# Patient Record
Sex: FEMALE | Race: WHITE | URBAN - METROPOLITAN AREA
[De-identification: names, ages, dates, MRNs, and addresses within clinical notes are randomized per-mention and may not be internally consistent; named-entity substitution may affect disease eponyms.]

---

## 2020-09-10 ENCOUNTER — OFFICE VISIT (OUTPATIENT)
Dept: URBAN - METROPOLITAN AREA CLINIC 32 | Facility: CLINIC | Age: 70
End: 2020-09-10
Payer: MEDICARE

## 2020-09-10 PROCEDURE — 99203 OFFICE O/P NEW LOW 30 MIN: CPT | Performed by: OPHTHALMOLOGY

## 2020-09-10 PROCEDURE — 92285 EXTERNAL OCULAR PHOTOGRAPHY: CPT | Performed by: OPHTHALMOLOGY

## 2020-09-10 ASSESSMENT — INTRAOCULAR PRESSURE
OD: 12
OS: 11

## 2020-09-10 NOTE — IMPRESSION/PLAN
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 Left. Condition: new problem addtl w/u needed. Symptoms: may improve with surgery. Vision: vision threatening. Plan: Discussed diagnosis in detail with patient. Discussed treatment options with patient. Biopsy of left lower eyelid is + for Melanoma in situ. Will need referral for Mohs procedure, followed by reconstruction of eyelid by myself. Surgical treatment is recommended. Recommend full thickness skin graft and or flaps with left lateral canthoplasty. Surgical risks and benefits were discussed, explained and understood by patient. Patient elects to have surgery. RL2. Avoid NSAIDS 7 days prior to surgery.

## 2020-09-10 NOTE — ASSESSMENT/PLAN
Impression: Photo External - OD: Good-upper lid skin fold rests behind lashes; OS Good-6.0mmx5.0mm hyperpigmentation of lateral LL skin Plan: biopsy site is positive for malignancy, coordinate mohs and closure

## 2020-09-18 ENCOUNTER — NEW PATIENT (OUTPATIENT)
Dept: URBAN - METROPOLITAN AREA CLINIC 30 | Facility: CLINIC | Age: 70
End: 2020-09-18
Payer: MEDICARE

## 2020-09-18 DIAGNOSIS — H25.813 COMBINED FORMS OF AGE-RELATED CATARACT, BILATERAL: ICD-10-CM

## 2020-09-18 PROCEDURE — 92134 CPTRZ OPH DX IMG PST SGM RTA: CPT | Performed by: OPTOMETRIST

## 2020-09-18 PROCEDURE — 92004 COMPRE OPH EXAM NEW PT 1/>: CPT | Performed by: OPTOMETRIST

## 2020-09-18 ASSESSMENT — VISUAL ACUITY
OS: 20/30
OD: 20/30

## 2020-09-18 ASSESSMENT — INTRAOCULAR PRESSURE
OS: 9
OD: 11

## 2020-09-18 ASSESSMENT — KERATOMETRY
OS: 42.17
OD: 42.14

## 2020-09-25 ENCOUNTER — PROCEDURE (OUTPATIENT)
Dept: URBAN - METROPOLITAN AREA SURGERY 10 | Facility: SURGERY | Age: 70
End: 2020-09-25
Payer: MEDICARE

## 2020-09-25 RX ORDER — HYDROCODONE BITARTRATE AND ACETAMINOPHEN 5; 325 MG/1; MG/1
TABLET ORAL
Qty: 24 | Refills: 0 | Status: ACTIVE
Start: 2020-09-25

## 2020-09-30 ENCOUNTER — POST-OPERATIVE VISIT (OUTPATIENT)
Dept: URBAN - METROPOLITAN AREA CLINIC 32 | Facility: CLINIC | Age: 70
End: 2020-09-30

## 2020-09-30 DIAGNOSIS — Z48.89 ENCOUNTER FOR OTHER SPECIFIED SURGICAL AFTERCARE: Primary | ICD-10-CM

## 2020-09-30 PROCEDURE — 99024 POSTOP FOLLOW-UP VISIT: CPT | Performed by: OPTOMETRIST

## 2020-09-30 NOTE — IMPRESSION/PLAN
Impression: S/P MOHS Reconstruction total eyelid  - 1 Day. Encounter for other specified surgical aftercare  Z48.89. Post operative instructions reviewed - Plan: The surgical eye(s) is improving well. Continue to follow current drop plan and post operative instructions. Recommend artificial tears throughout post operative period. RTC for scheduled follow up.

## 2020-10-08 ENCOUNTER — POST-OPERATIVE VISIT (OUTPATIENT)
Dept: URBAN - METROPOLITAN AREA CLINIC 32 | Facility: CLINIC | Age: 70
End: 2020-10-08

## 2020-10-08 PROCEDURE — 99024 POSTOP FOLLOW-UP VISIT: CPT | Performed by: OPHTHALMOLOGY

## 2020-10-08 ASSESSMENT — INTRAOCULAR PRESSURE
OD: 9
OS: 7

## 2020-10-08 NOTE — IMPRESSION/PLAN
Impression: S/P MOHS Reconstruction total eyelid OS - 13 Days. Encounter for other specified surgical aftercare  Z48.89. Post operative instructions reviewed - Condition is improving -photo updated Plan: use aquaphor for increased moisturization to skin graft and flap.  --Continue aqupahor TID to skin graft and QHS to bilateral upper lid incisions

## 2020-11-05 ENCOUNTER — POST-OPERATIVE VISIT (OUTPATIENT)
Dept: URBAN - METROPOLITAN AREA CLINIC 32 | Facility: CLINIC | Age: 70
End: 2020-11-05

## 2020-11-05 PROCEDURE — 99024 POSTOP FOLLOW-UP VISIT: CPT | Performed by: OPHTHALMOLOGY

## 2020-11-05 ASSESSMENT — INTRAOCULAR PRESSURE
OS: 9
OD: 11

## 2020-11-05 NOTE — IMPRESSION/PLAN
Impression: S/P MOHS Reconstruction total eyelid OS - 37 Days. Encounter for other specified surgical aftercare  Z48.89. Photo updated today Plan: start vitamin e oil with massage to upper lid incisions once daily for 60 seconds
start vitamin e oil with massage to left lateral lower eyelid skin graft once daily for 60 seconds.

## 2020-12-17 ENCOUNTER — POST-OPERATIVE VISIT (OUTPATIENT)
Dept: URBAN - METROPOLITAN AREA CLINIC 32 | Facility: CLINIC | Age: 70
End: 2020-12-17
Payer: MEDICARE

## 2020-12-17 PROCEDURE — 99024 POSTOP FOLLOW-UP VISIT: CPT | Performed by: OPHTHALMOLOGY

## 2020-12-17 ASSESSMENT — INTRAOCULAR PRESSURE
OS: 9
OD: 9

## 2020-12-17 NOTE — IMPRESSION/PLAN
Impression:  Encounter for other specified surgical aftercare  Z48.89. Plan: skin graft is healing nicely, continue vitamin e oil and massage --Advised patient to use artificial tears for comfort.

## 2021-02-11 ENCOUNTER — OFFICE VISIT (OUTPATIENT)
Dept: URBAN - METROPOLITAN AREA CLINIC 32 | Facility: CLINIC | Age: 71
End: 2021-02-11

## 2021-02-11 DIAGNOSIS — D03.122 MELANOMA IN SITU OF LEFT LOWER EYELID, INCLUDING CANTHUS: Primary | Chronic | ICD-10-CM

## 2021-02-11 PROCEDURE — 99213 OFFICE O/P EST LOW 20 MIN: CPT | Performed by: OPHTHALMOLOGY

## 2021-02-11 ASSESSMENT — INTRAOCULAR PRESSURE
OD: 12
OS: 11

## 2021-02-11 NOTE — IMPRESSION/PLAN
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 Condition: resolved. Symptoms: will continue to monitor. Vision: vision threatening. Plan: Discussed diagnosis in detail with patient. Discussed treatment options with patient. No treatment is required at this time, no evidence of recurrence. Will continue to observe condition and or symptoms.

## 2021-07-29 ENCOUNTER — OFFICE VISIT (OUTPATIENT)
Dept: URBAN - METROPOLITAN AREA CLINIC 32 | Facility: CLINIC | Age: 71
End: 2021-07-29
Payer: MEDICARE

## 2021-07-29 DIAGNOSIS — H01.021 SQUAMOUS BLEPHARITIS RIGHT UPPER EYELID: ICD-10-CM

## 2021-07-29 DIAGNOSIS — H01.024 SQUAMOUS BLEPHARITIS LEFT UPPER EYELID: ICD-10-CM

## 2021-07-29 PROCEDURE — 99213 OFFICE O/P EST LOW 20 MIN: CPT | Performed by: OPHTHALMOLOGY

## 2021-07-29 RX ORDER — DOXYCYCLINE 100 MG/1
100 MG TABLET, FILM COATED ORAL
Qty: 60 | Refills: 1 | Status: INACTIVE
Start: 2021-07-29 | End: 2022-01-13

## 2021-07-29 ASSESSMENT — INTRAOCULAR PRESSURE
OD: 13
OS: 13

## 2021-07-29 NOTE — IMPRESSION/PLAN
Impression: Squamous blepharitis left upper eyelid: H01.024. Left. Condition: new problem addtl w/u needed.  Plan: see plan #2

## 2021-07-29 NOTE — IMPRESSION/PLAN
Impression: Squamous blepharitis right upper eyelid: H01.021. Condition: new problem addtl w/u needed. Symptoms: will treat with meds. Vision: vision not affected. Plan: Discussed diagnosis in detail with patient. Discussed treatment options with patient. Apply hot compress for 3-5 minutes followed by firm upward massage to lower lids, and firm downward massage to upper lids. ERx doxycycline 100mg tabs to use BID x 30 days then Qday.

## 2021-07-29 NOTE — IMPRESSION/PLAN
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 Condition: established, stable. Symptoms: will continue to monitor. Vision: vision threatening. Plan: Discussed diagnosis in detail with patient. Discussed treatment options with patient. No treatment is required at this time, no evidence of recurrence. Will continue to observe condition and or symptoms.

## 2022-01-13 ENCOUNTER — OFFICE VISIT (OUTPATIENT)
Dept: URBAN - METROPOLITAN AREA CLINIC 32 | Facility: CLINIC | Age: 72
End: 2022-01-13
Payer: MEDICARE

## 2022-01-13 DIAGNOSIS — Z85.820 PERSONAL HISTORY OF MALIGNANT MELANOMA OF SKIN: Primary | ICD-10-CM

## 2022-01-13 PROCEDURE — 99213 OFFICE O/P EST LOW 20 MIN: CPT | Performed by: OPHTHALMOLOGY

## 2022-01-13 NOTE — IMPRESSION/PLAN
Impression: Personal history of malignant melanoma of skin: Z85.820. Left. Condition: established, stable. Symptoms: will continue to monitor. Plan: Discussed diagnosis in detail with patient. Discussed treatment options with patient. No treatment is required at this time, no evidence of recurrence. Will continue to observe condition and or symptoms. Keep regular scheduled followup with dermatology as directed. 1 year for eyelid recheck or sooner if any new areas of concern appear.